# Patient Record
Sex: MALE | Race: WHITE | Employment: FULL TIME | URBAN - METROPOLITAN AREA
[De-identification: names, ages, dates, MRNs, and addresses within clinical notes are randomized per-mention and may not be internally consistent; named-entity substitution may affect disease eponyms.]

---

## 2021-09-17 ENCOUNTER — OFFICE VISIT (OUTPATIENT)
Dept: URGENT CARE | Facility: URGENT CARE | Age: 41
End: 2021-09-17
Payer: OTHER MISCELLANEOUS

## 2021-09-17 VITALS
SYSTOLIC BLOOD PRESSURE: 133 MMHG | OXYGEN SATURATION: 100 % | RESPIRATION RATE: 18 BRPM | DIASTOLIC BLOOD PRESSURE: 76 MMHG | TEMPERATURE: 98.1 F | HEART RATE: 80 BPM | WEIGHT: 180 LBS

## 2021-09-17 DIAGNOSIS — S61.211A LACERATION OF LEFT INDEX FINGER WITHOUT FOREIGN BODY WITHOUT DAMAGE TO NAIL, INITIAL ENCOUNTER: Primary | ICD-10-CM

## 2021-09-17 PROCEDURE — 99203 OFFICE O/P NEW LOW 30 MIN: CPT | Mod: 25 | Performed by: PHYSICIAN ASSISTANT

## 2021-09-17 PROCEDURE — 90471 IMMUNIZATION ADMIN: CPT | Performed by: PHYSICIAN ASSISTANT

## 2021-09-17 PROCEDURE — 90715 TDAP VACCINE 7 YRS/> IM: CPT | Performed by: PHYSICIAN ASSISTANT

## 2021-09-17 NOTE — LETTER
[facility] [address] [address]  [phone]        2021    REPORT OF WORK ABILITY    PATIENT DATA  Employee Name: Rigoberto Cheek        : 1980   (Not on file)  Work related injury: YES  Today's date: 2021  Date of injury: 2021 works at [employer]     PROVIDER EVALUATION: Please fill in as needed.  Please give copy to employee for employer.  1. Diagnosis: Laceration right second finger  2. Treatment: Dermabond applied  3. Medication: non needed.  OTC med if needed for pain   NOTE: When ordering a medication, [place] Rules require Work Comp or WC on prescriptions.  4. Return to work date: May return today with no restrictions.  Must keep dry and clean      RESTRICTIONS: Unlimited unless listed.  Restrictions apply to home and leisure also.  If work within restrictions is not available, the employee is totally disabled.  Provider comments:   Medical Examiner: Loree Siddiqui PA-C      License or registration: [id number]    Next appointment: As needed    CC: Employer, Managed Care Plan/Payor, Patient

## 2021-09-17 NOTE — PROGRESS NOTES
SUBJECTIVE:     Chief Complaint   Patient presents with     Urgent Care     cut left index finger at work      Rigoberto Cheek is a 41 year old male who presents to the clinic with a laceration on the left 2nd finger sustained 2 hour(s) ago.  This is a work related and accidental injury.    Mechanism of injury: works at [employer] and cut with knife while cutting chicken.    Associated symptoms: Denies numbness, weakness, or loss of function  Last tetanus booster within 10 years: unknown and not listed in [facility]    EXAM:   The patient appears today in alert,no apparent distress distress  VITALS: /76   Pulse 80   Temp 98.1  F (36.7  C)   Resp 18   Wt 81.6 kg (180 lb)   SpO2 100%     Size of laceration: 1.5 centimeters  Characteristics of the laceration: V shaped flap that is lying flat and no bleeding  Tendon function intact: yes  Sensation to light touch intact: yes  Pulses intact: yes  Picture included in patient's chart: no    Assessment:  Laceration of left index finger without foreign body without damage to nail, initial encounter    PLAN:  PROCEDURE NOTE::  Wound cleaned with HIBICLENS  Wound soaked  Dermabond was applied  After care instructions:  Keep wound clean and dry for the next 24-48 hours  Signs of infection discussed today  May return to work as long as wound is kept clean and dry  Discussed the probability of scarring  Finger splint given   adacel updated

## 2021-09-17 NOTE — LETTER
[facility]  [address] [address]  [phone]      September 17, 2021    RE:  Rigoberto Pedro Cheek                                                                                                                                                       [address] [facility] [address]            To whom it may concern:    Rigoberto Pedro Cheek is under my professional care for  Finger laceration .  Please excuse from the remainder of his shift to allow wound to start healing.  .          Sincerely,        [facility]